# Patient Record
Sex: FEMALE | Race: WHITE | Employment: UNEMPLOYED | URBAN - METROPOLITAN AREA
[De-identification: names, ages, dates, MRNs, and addresses within clinical notes are randomized per-mention and may not be internally consistent; named-entity substitution may affect disease eponyms.]

---

## 2017-10-31 ENCOUNTER — OFFICE VISIT (OUTPATIENT)
Dept: OBGYN CLINIC | Age: 47
End: 2017-10-31
Payer: COMMERCIAL

## 2017-10-31 ENCOUNTER — HOSPITAL ENCOUNTER (OUTPATIENT)
Age: 47
Setting detail: SPECIMEN
Discharge: HOME | End: 2017-10-31
Payer: COMMERCIAL

## 2017-10-31 VITALS
DIASTOLIC BLOOD PRESSURE: 77 MMHG | HEART RATE: 89 BPM | OXYGEN SATURATION: 100 % | SYSTOLIC BLOOD PRESSURE: 132 MMHG | WEIGHT: 122 LBS | HEIGHT: 60 IN | RESPIRATION RATE: 16 BRPM | BODY MASS INDEX: 23.95 KG/M2

## 2017-10-31 DIAGNOSIS — Z01.419 ENCOUNTER FOR ROUTINE GYNECOLOGICAL EXAMINATION WITH PAPANICOLAOU SMEAR OF CERVIX: Primary | ICD-10-CM

## 2017-10-31 DIAGNOSIS — N93.9 ABNORMAL UTERINE BLEEDING (AUB): ICD-10-CM

## 2017-10-31 PROCEDURE — 99396 PREV VISIT EST AGE 40-64: CPT | Performed by: OBSTETRICS & GYNECOLOGY

## 2017-11-06 ENCOUNTER — HOSPITAL ENCOUNTER (OUTPATIENT)
Dept: ULTRASOUND IMAGING | Age: 47
Discharge: HOME | End: 2017-11-06
Payer: COMMERCIAL

## 2017-11-06 DIAGNOSIS — N93.9 ABNORMAL UTERINE BLEEDING (AUB): ICD-10-CM

## 2017-11-06 PROCEDURE — 76830 TRANSVAGINAL US NON-OB: CPT

## 2017-11-06 PROCEDURE — 76856 US EXAM PELVIC COMPLETE: CPT

## 2017-11-06 NOTE — PROGRESS NOTES
Robyn Paniagua  10/31/2017              48 y.o. Chief Complaint   Patient presents with    Gynecologic Exam     NM LYMPHOSCINTIGRAM 2017 SURGERY REQUESTED last pap was 16 wnl              No referring provider defined for this encounter. The patient was seen and examined. She has no chief complaint today and is here for her annual exam.  Her bowels are regular. There are no voiding complaints. She denies any bloating. She denies vaginal discharge.     HPI : 44yo  for annual exam. Having heavy painful periods that are worsening, treated for right breast cancer this year, not a CHT candidate.  ________________________________________________________________________    Past Medical History:   Diagnosis Date    Cancer ([facility] [address]     right breast, skin cancer    History of palpitations     borderline MVP    Menorrhagia     Migraine     Thyroid disease     Hypothyroidism                                                                   Past Surgical History:   Procedure Laterality Date    BREAST BIOPSY      ,     BREAST ENHANCEMENT SURGERY Bilateral     BREAST IMPLANT REMOVAL Bilateral     BREAST LUMPECTOMY Right     MASTECTOMY, BILATERAL  01/10/2017    sparing-left prophylactic with right sentinel node biopsies (8am) with right axillary lymph node dissection breast tissue expander insertion-alloderm    SKIN BIOPSY      skin cancer removed from back    SKIN BIOPSY Right     roght side of clavical     Family History   Problem Relation Age of Onset    Diabetes Father     Hypertension Father     Prostate Cancer Father     Breast Cancer Mother      Stage 1 (RAD/LUMP)    Cancer Maternal Grandmother      Pancreatic     History   Smoking Status    Never Smoker   Smokeless Tobacco    Never Used     History   Alcohol Use    Yes     Comment: occasionally       MEDICATIONS:  Current Outpatient Prescriptions   Medication Sig Dispense Refill    ibuprofen (ADVIL;MOTRIN) 800 MG ROS: No SOB, Pneumoniae,Cough, or Pulmonary Embolism History  Cardiovascular ROS: No Chest Pain with Exertion, Palpitations, Syncope, Edema, Arrhythmia  Gastrointestinal ROS: No Indigestion, Heartburn, Nausea, vomiting, Diahrea, Constipation,or Bowel Changes; No Bloody Stools or melena  Genito-Urinary ROS: No Dysuria, Hematuria or Nocturia. No Urinary Incontinence or Vaginal Discharge  Musculoskeletal ROS: No Arthralgia, Arthritis,Gout,Osteoporosis or Rheumatism  Neurological ROS: No CVA, Migraines, Epilepsy, Seizure Hx, or Limb Weakness  Dermatological ROS: No Rash, Itching, Hives, Mole Changes or Cancer                                                                                                                                                                                                                                PHYSICAL Exam:     Constitutional:  Blood pressure 132/77, pulse 89, resp. rate 16, height 5' (1.524 m), weight 122 lb (55.3 kg), last menstrual period 10/08/2017, SpO2 100 %, not currently breastfeeding. General Appearance: This  is a well Developed, well Nourished, well groomed female. Her BMI was reviewed. Nutritional decision making was discussed. Skin:  There was a Normal Inspection of the skin without rashes or lesions. There were no rashes. (Papular, Maculopapular, Hives, Pustular, Macular)     There were no lesions (Ulcers, Erythema, Abn. Appearing Nevi)        Lymphatic:  No Lymph Nodes were Palpable in the neck , axilla or groin. Inguinal lymph nodes wnl     Neck and EENT:  The neck was supple. There were no masses   The thyroid was not enlarged and had no masses. Respiratory: The lungs were auscultated and found to be clear. There were no rales, rhonchi or wheezes. There was a good respiratory effort. Cardiovascular: The heart was in a regular rate and rhythm. . No S3 or S4. There was no murmur appreciated. Location, grade, and radiation are not applicable. Extremities: The patients extremities were without calf tenderness, edema, or varicosities. There was full range of motion in all four extremities. Abdomen: The abdomen was soft and non-tender. There were good bowel sounds in all quadrants and there was no guarding, rebound or rigidity. On evaluation there was no evidence of hepatosplenomegaly and there was no costal vertebral anastacia tenderness bilaterally. No hernias were appreciated. Abdominal Scars: none    Psych: The patient had a normal Orientation to: Time, Place, Person, and Situation  There is no Mood / Affect changes    Breast:  (Chest)  Implants noted, healed incisions, and nipple reconstruction healing well without s/s infection  Self breast exams were reviewed in detail. Literature was given. Pelvic Exam:  Vulva and vagina appear normal. Bimanual exam reveals normal uterus and adnexa. Rectal Exam:  def      Musculosk:  Normal Gait and station was noted. Digits were evaluated without abnormal findings. Range of motion, stability and strength were evaluated and found to be appropriate for the patients age. ASSESSMENT:      48 y.o. Annual  1. Encounter for routine gynecological examination with Papanicolaou smear of cervix    2. Abnormal uterine bleeding (AUB)         Chief Complaint   Patient presents with    Gynecologic Exam     NM LYMPHOSCINTIGRAM 1/2017 SURGERY REQUESTED last pap was 9/26/16 wnl          Past Medical History:   Diagnosis Date    Cancer [facility])     right breast, skin cancer    History of palpitations     borderline MVP    Menorrhagia     Migraine     Thyroid disease     Hypothyroidism         Patient Active Problem List   Diagnosis    Carcinoma in situ of right breast          Hereditary Breast, Ovarian, Colon and Uterine Cancer screening Done. Tobacco & Secondary smoke risks reviewed; instructed on cessation and avoidance      Counseling Completed:   Annual  Breast cancer  AUB-obtain USN

## 2017-11-07 ENCOUNTER — OFFICE VISIT (OUTPATIENT)
Dept: OBGYN CLINIC | Age: 47
End: 2017-11-07
Payer: COMMERCIAL

## 2017-11-07 VITALS
HEIGHT: 60 IN | DIASTOLIC BLOOD PRESSURE: 62 MMHG | BODY MASS INDEX: 23.95 KG/M2 | SYSTOLIC BLOOD PRESSURE: 108 MMHG | WEIGHT: 122 LBS | OXYGEN SATURATION: 100 % | HEART RATE: 78 BPM | RESPIRATION RATE: 16 BRPM

## 2017-11-07 DIAGNOSIS — N93.9 ABNORMAL UTERINE BLEEDING (AUB): Primary | ICD-10-CM

## 2017-11-07 PROCEDURE — 99212 OFFICE O/P EST SF 10 MIN: CPT | Performed by: OBSTETRICS & GYNECOLOGY

## 2017-11-08 ENCOUNTER — PATIENT MESSAGE (OUTPATIENT)
Dept: OBGYN CLINIC | Age: 47
End: 2017-11-08

## 2017-11-08 LAB — CYTOLOGY REPORT: NORMAL

## 2017-11-08 NOTE — TELEPHONE ENCOUNTER
From: Mazin Perry  To: Bobby Leigh MD  Sent: 11/8/2017 11:57 AM EST  Subject: Visit Follow-Up Question    Thank you for taking your time with me yesterday to explain options, and how an ablation works. I do want to proceed with getting it done. I do have a question about timing of the procedure. After I got home and looked at my calendar, I realized that I have a colonoscopy scheduled dec 13. Is there any reason to be concerned about having these two procedures under anesthesia so close together?   Thanks, Dariusz

## 2017-11-09 NOTE — PROGRESS NOTES
The patient was seen today. She is here regarding USN results . Her bowels are regular and she is voiding without difficulty. HPI: 44yo  who has  with vasectomy, having breast cancer diagnosis this year, had previously used OCP to control heavy painful periods and is now not a candidate. Using double protection to avoid social embarressment. Past Medical History:   Diagnosis Date    Cancer [facility])     right breast, skin cancer    History of palpitations     borderline MVP    Menorrhagia     Migraine     Thyroid disease     Hypothyroidism     Past Surgical History:   Procedure Laterality Date    BREAST BIOPSY      ,     BREAST ENHANCEMENT SURGERY Bilateral     BREAST IMPLANT REMOVAL Bilateral     BREAST LUMPECTOMY Right     MASTECTOMY, BILATERAL  01/10/2017    sparing-left prophylactic with right sentinel node biopsies (8am) with right axillary lymph node dissection breast tissue expander insertion-alloderm    SKIN BIOPSY      skin cancer removed from back    SKIN BIOPSY Right 2017    roght side of clavical     REVIEW OF SYSTEMS:        A minimum of an eleven point review of systems was completed and found to be negative except for the pertinent positives found below. Constitutional: No fever, chills or malaise; No weight change or fatigue  Head and Eyes: No vision, Headache, Dizziness or trauma in last 12 months  ENT ROS: No hearing, Tinnitis, sinus or taste problems  Hematological and Lymphatic ROS:No Lymphoma, Von Willebrand's, Hemophillia or Bleeding History  Psych ROS: No Depression, Homicidal thoughts,suicidal thoughts, or anxiety  Breast ROS: No prior breast abnormalities or lumps  Respiratory ROS: No SOB, Pneumoniae,Cough, or Pulmonary Embolism History  Cardiovascular ROS: No Chest Pain with Exertion, Palpitations, Syncope, Edema, Arrhythmia  Gastrointestinal ROS: No Indigestion, Heartburn, Nausea, vomiting, Diahrea, Constipation,or Bowel Changes;  No Bloody Stools or melena  Genito-Urinary ROS: No Dysuria, Hematuria or Nocturia. No Urinary Incontinence or Vaginal Discharge  Musculoskeletal ROS: No Arthralgia, Arthritis,Gout,Osteoporosis or Rheumatism  Neurological ROS: No CVA, Migraines, Epilepsy, Seizure Hx, or Limb Weakness  Dermatological ROS: No Rash, Itching, Hives, Mole Changes or Cancer                                            Blood pressure 108/62, pulse 78, resp. rate 16, height 5' (1.524 m), weight 122 lb (55.3 kg), last menstrual period 10/08/2017, SpO2 100 %, not currently breastfeeding. Pelvic: Exam deferred. Assessment:   AUB-discussed ultrasound and option of endometrial ablation    PLAN:  Will proceed with Hysteroscopy Teodora PANDA  Return to the office in postop weeks. Counseled on preventative health maintenance follow-up. Patient was seen with total face to face time of 10 minutes. More than 50% of this visit was counseling and education regarding There were no encounter diagnoses. and Results   as well as  counseling on preventative health maintenance follow-up.

## 2017-11-30 ENCOUNTER — ANESTHESIA EVENT (OUTPATIENT)
Dept: OPERATING ROOM | Age: 47
End: 2017-11-30
Payer: COMMERCIAL

## 2017-11-30 RX ORDER — IBUPROFEN 200 MG
200 TABLET ORAL EVERY 6 HOURS PRN
Status: ON HOLD | COMMUNITY
End: 2017-12-01 | Stop reason: HOSPADM

## 2017-11-30 RX ORDER — INDAPAMIDE 1.25 MG
1 TABLET ORAL DAILY
COMMUNITY
Start: 2017-10-17

## 2017-11-30 NOTE — H&P
Breast biopsy (Bilateral); Breast surgery (Bilateral, 2017); Breast enhancement surgery (Bilateral, 2000); skin biopsy (2010); and skin biopsy (Right, 2017). ALLERGIES:  Allergies as of 11/09/2017    (No Known Allergies)       MEDICATIONS:  No current facility-administered medications for this encounter. FAMILY HISTORY:  family history includes Breast Cancer in her mother; Cancer in her maternal grandmother; Diabetes in her father; High Blood Pressure in her father; Hypertension in her father; Prostate Cancer in her father. SOCIAL HISTORY:   reports that she has never smoked. She has never used smokeless tobacco. She reports that she drinks about 0.6 oz of alcohol per week . She reports that she does not use drugs. VITALS:  Vitals:    11/30/17 1330 12/01/17 1002   BP:  114/65   Pulse:  85   Resp:  16   Temp:  98.4 °F (36.9 °C)   TempSrc:  Temporal   SpO2:  100%   Weight: 122 lb (55.3 kg)    Height: 5' (1.524 m)                                                                                                                              PHYSICAL EXAM:     General: AAO x 3, NAD  Heart: RRR, no murmur  Lungs: CTA b/l, no wheezing/rhonci   Abdomen: soft, non tender, BS x 4, no guarding or rigidity   Extremities: non tender, non edematous     LAB RESULTS:  No visits with results within 4 Week(s) from this visit. Latest known visit with results is:   Hospital Outpatient Visit on 10/31/2017   Component Date Value Ref Range Status    Cytology Report 11/08/2017    Final                    Value:(NOTE)  [id number]  [email or web address]  ANATOMIC PATHOLOGY  [address] [place] [address].   [place], [address]  [phone]  Fax: [phone]  GYNECOLOGIC CYTOLOGY REPORT    Patient Name: Shamar Beltran  MR#: [id number]  Specimen #[id number]  Source:  1: Cervical material, (ThinPrep vial, Imaging-assisted review)    Clinical History  No LMP date given  Z01.419 Routine gyn exam without abnormal findings  High Risk HPV DNA testing is requested if the diagnosis is ASC-US    INTERPRETATION    Cervical material, (ThinPrep vial, Imaging-assisted review):  Specimen Adequacy:      Satisfactory for evaluation.      - Endocervical/transformation zone component present. Descriptive Diagnosis:      Negative for intraepithelial lesion or malignancy. Cytotechnologist:   KATHY Cruz(ASCP)  **Electronically Signed Out**  mk/11/8/2017         DIAGNOSTICS:  Us Non Ob Transvaginal    Result Date: 11/7/2017  This is a copy and paste from another accession per [address] request EXAMINATION: PELVIC ULTRASOUND 11/6/2017 TECHNIQUE: Transabdominal and transvaginal pelvic ultrasound was performed. COMPARISON: None HISTORY: ORDERING SYSTEM PROVIDED HISTORY: Abnormal uterine bleeding (AUB) TECHNOLOGIST PROVIDED HISTORY: Reason for exam:-&gt;aub Ordering Physician Provided Reason for Exam: heavy cycles Abnormal uterine bleeding, heavy cycles for long time FINDINGS: Measurements: Uterus: 10.5 x 4.4 x 6 cm. Endometrial stripe: 12 mm Right Ovary: 2.7 x 1.2 x 1.8 cm Left Ovary: 1.7 x 1.6 x 2 cm Ultrasound Findings: Uterus: Uterus has a somewhat heterogeneous myometrial echotexture with small uterine fibroids, the largest in the anterior right uterine body measuring 2.5 x 2.8 x 2.2 cm. Endometrial stripe: Endometrial stripe is within normal limits. Right Ovary: Right ovary is within normal limits. Small follicles are present. Left Ovary: Left ovary is within normal limits. Free Fluid: No evidence of free fluid. Small uterine fibroids, the largest measuring up to 2.8 cm. Us Pelvis Complete    Result Date: 11/6/2017  EXAMINATION: PELVIC ULTRASOUND 11/6/2017 TECHNIQUE: Transabdominal and transvaginal pelvic ultrasound was performed.  COMPARISON: None HISTORY: ORDERING SYSTEM PROVIDED HISTORY: Abnormal uterine bleeding (AUB) TECHNOLOGIST PROVIDED HISTORY: Reason for exam:->aub Ordering Physician Provided Reason for Exam: heavy cycles Abnormal uterine bleeding, heavy cycles for long time FINDINGS: Measurements: Uterus:  10.5 x 4.4 x 6 cm. Endometrial stripe:  12 mm Right Ovary:  2.7 x 1.2 x 1.8 cm Left Ovary:  1.7 x 1.6 x 2 cm Ultrasound Findings: Uterus: Uterus has a somewhat heterogeneous myometrial echotexture with small uterine fibroids, the largest in the anterior right uterine body measuring 2.5 x 2.8 x 2.2 cm. Endometrial stripe: Endometrial stripe is within normal limits. Right Ovary: Right ovary is within normal limits. Small follicles are present. Left Ovary:  Left ovary is within normal limits. Free Fluid: No evidence of free fluid. Small uterine fibroids, the largest measuring up to 2.8 cm. DIAGNOSIS & PLAN:  1. Abnormal Uterine Bleeding              - Proceed with planned procedure: Hysteroscopy, D&C with Novasure              - Consent signed, on chart. - The patient is ready for transport to the operative suite.       Rehana Elkins DO  Ob/Gyn Resident  Pager: [phone] [facility] [facility]  12/1/2017, 10:07 AM

## 2017-12-01 ENCOUNTER — ANESTHESIA (OUTPATIENT)
Dept: OPERATING ROOM | Age: 47
End: 2017-12-01
Payer: COMMERCIAL

## 2017-12-01 ENCOUNTER — HOSPITAL ENCOUNTER (OUTPATIENT)
Age: 47
Setting detail: OUTPATIENT SURGERY
Discharge: HOME | End: 2017-12-01
Attending: OBSTETRICS & GYNECOLOGY | Admitting: OBSTETRICS & GYNECOLOGY
Payer: COMMERCIAL

## 2017-12-01 VITALS
HEART RATE: 73 BPM | RESPIRATION RATE: 20 BRPM | HEIGHT: 60 IN | SYSTOLIC BLOOD PRESSURE: 111 MMHG | WEIGHT: 122 LBS | DIASTOLIC BLOOD PRESSURE: 70 MMHG | TEMPERATURE: 96.8 F | OXYGEN SATURATION: 96 % | BODY MASS INDEX: 23.95 KG/M2

## 2017-12-01 VITALS — DIASTOLIC BLOOD PRESSURE: 46 MMHG | OXYGEN SATURATION: 100 % | TEMPERATURE: 96 F | SYSTOLIC BLOOD PRESSURE: 95 MMHG

## 2017-12-01 LAB — HCG, PREGNANCY URINE (POC): NEGATIVE

## 2017-12-01 PROCEDURE — 2580000003 HC RX 258: Performed by: ANESTHESIOLOGY

## 2017-12-01 PROCEDURE — 88305 TISSUE EXAM BY PATHOLOGIST: CPT

## 2017-12-01 PROCEDURE — 84703 CHORIONIC GONADOTROPIN ASSAY: CPT

## 2017-12-01 PROCEDURE — 6370000000 HC RX 637 (ALT 250 FOR IP): Performed by: ANESTHESIOLOGY

## 2017-12-01 PROCEDURE — 6360000002 HC RX W HCPCS: Performed by: ANESTHESIOLOGY

## 2017-12-01 PROCEDURE — 7100000000 HC PACU RECOVERY - FIRST 15 MIN: Performed by: OBSTETRICS & GYNECOLOGY

## 2017-12-01 PROCEDURE — 3600000004 HC SURGERY LEVEL 4 BASE: Performed by: OBSTETRICS & GYNECOLOGY

## 2017-12-01 PROCEDURE — 3600000014 HC SURGERY LEVEL 4 ADDTL 15MIN: Performed by: OBSTETRICS & GYNECOLOGY

## 2017-12-01 PROCEDURE — 3700000001 HC ADD 15 MINUTES (ANESTHESIA): Performed by: OBSTETRICS & GYNECOLOGY

## 2017-12-01 PROCEDURE — 7100000001 HC PACU RECOVERY - ADDTL 15 MIN: Performed by: OBSTETRICS & GYNECOLOGY

## 2017-12-01 PROCEDURE — 6360000002 HC RX W HCPCS: Performed by: NURSE ANESTHETIST, CERTIFIED REGISTERED

## 2017-12-01 PROCEDURE — 7100000011 HC PHASE II RECOVERY - ADDTL 15 MIN: Performed by: OBSTETRICS & GYNECOLOGY

## 2017-12-01 PROCEDURE — 7100000010 HC PHASE II RECOVERY - FIRST 15 MIN: Performed by: OBSTETRICS & GYNECOLOGY

## 2017-12-01 PROCEDURE — 6370000000 HC RX 637 (ALT 250 FOR IP): Performed by: NURSE ANESTHETIST, CERTIFIED REGISTERED

## 2017-12-01 PROCEDURE — 2580000003 HC RX 258: Performed by: OBSTETRICS & GYNECOLOGY

## 2017-12-01 PROCEDURE — 58563 HYSTEROSCOPY ABLATION: CPT | Performed by: OBSTETRICS & GYNECOLOGY

## 2017-12-01 PROCEDURE — 2500000003 HC RX 250 WO HCPCS: Performed by: NURSE ANESTHETIST, CERTIFIED REGISTERED

## 2017-12-01 PROCEDURE — 3700000000 HC ANESTHESIA ATTENDED CARE: Performed by: OBSTETRICS & GYNECOLOGY

## 2017-12-01 RX ORDER — OXYCODONE HYDROCHLORIDE AND ACETAMINOPHEN 5; 325 MG/1; MG/1
2 TABLET ORAL PRN
Status: COMPLETED | OUTPATIENT
Start: 2017-12-01 | End: 2017-12-01

## 2017-12-01 RX ORDER — ONDANSETRON 2 MG/ML
4 INJECTION INTRAMUSCULAR; INTRAVENOUS
Status: CANCELLED | OUTPATIENT
Start: 2017-12-01 | End: 2017-12-01

## 2017-12-01 RX ORDER — SODIUM CHLORIDE, SODIUM LACTATE, POTASSIUM CHLORIDE, CALCIUM CHLORIDE 600; 310; 30; 20 MG/100ML; MG/100ML; MG/100ML; MG/100ML
INJECTION, SOLUTION INTRAVENOUS CONTINUOUS
Status: DISCONTINUED | OUTPATIENT
Start: 2017-12-01 | End: 2017-12-01 | Stop reason: HOSPADM

## 2017-12-01 RX ORDER — HYDRALAZINE HYDROCHLORIDE 20 MG/ML
5 INJECTION INTRAMUSCULAR; INTRAVENOUS EVERY 10 MIN PRN
Status: DISCONTINUED | OUTPATIENT
Start: 2017-12-01 | End: 2017-12-01 | Stop reason: HOSPADM

## 2017-12-01 RX ORDER — MAGNESIUM HYDROXIDE 1200 MG/15ML
LIQUID ORAL CONTINUOUS PRN
Status: DISCONTINUED | OUTPATIENT
Start: 2017-12-01 | End: 2017-12-01 | Stop reason: HOSPADM

## 2017-12-01 RX ORDER — LABETALOL HYDROCHLORIDE 5 MG/ML
5 INJECTION, SOLUTION INTRAVENOUS EVERY 10 MIN PRN
Status: DISCONTINUED | OUTPATIENT
Start: 2017-12-01 | End: 2017-12-01 | Stop reason: HOSPADM

## 2017-12-01 RX ORDER — FENTANYL CITRATE 50 UG/ML
25 INJECTION, SOLUTION INTRAMUSCULAR; INTRAVENOUS EVERY 5 MIN PRN
Status: CANCELLED | OUTPATIENT
Start: 2017-12-01

## 2017-12-01 RX ORDER — OXYCODONE HYDROCHLORIDE AND ACETAMINOPHEN 5; 325 MG/1; MG/1
1 TABLET ORAL PRN
Status: COMPLETED | OUTPATIENT
Start: 2017-12-01 | End: 2017-12-01

## 2017-12-01 RX ORDER — DIPHENHYDRAMINE HYDROCHLORIDE 50 MG/ML
12.5 INJECTION INTRAMUSCULAR; INTRAVENOUS
Status: DISCONTINUED | OUTPATIENT
Start: 2017-12-01 | End: 2017-12-01 | Stop reason: HOSPADM

## 2017-12-01 RX ORDER — DEXAMETHASONE SODIUM PHOSPHATE 10 MG/ML
INJECTION INTRAMUSCULAR; INTRAVENOUS PRN
Status: DISCONTINUED | OUTPATIENT
Start: 2017-12-01 | End: 2017-12-01 | Stop reason: SDUPTHER

## 2017-12-01 RX ORDER — PROPOFOL 10 MG/ML
INJECTION, EMULSION INTRAVENOUS PRN
Status: DISCONTINUED | OUTPATIENT
Start: 2017-12-01 | End: 2017-12-01 | Stop reason: SDUPTHER

## 2017-12-01 RX ORDER — ONDANSETRON 2 MG/ML
INJECTION INTRAMUSCULAR; INTRAVENOUS PRN
Status: DISCONTINUED | OUTPATIENT
Start: 2017-12-01 | End: 2017-12-01 | Stop reason: SDUPTHER

## 2017-12-01 RX ORDER — MORPHINE SULFATE 2 MG/ML
2 INJECTION, SOLUTION INTRAMUSCULAR; INTRAVENOUS EVERY 5 MIN PRN
Status: DISCONTINUED | OUTPATIENT
Start: 2017-12-01 | End: 2017-12-01 | Stop reason: HOSPADM

## 2017-12-01 RX ORDER — SODIUM CHLORIDE, SODIUM LACTATE, POTASSIUM CHLORIDE, CALCIUM CHLORIDE 600; 310; 30; 20 MG/100ML; MG/100ML; MG/100ML; MG/100ML
INJECTION, SOLUTION INTRAVENOUS CONTINUOUS
Status: CANCELLED | OUTPATIENT
Start: 2017-12-01

## 2017-12-01 RX ORDER — FENTANYL CITRATE 50 UG/ML
50 INJECTION, SOLUTION INTRAMUSCULAR; INTRAVENOUS EVERY 5 MIN PRN
Status: DISCONTINUED | OUTPATIENT
Start: 2017-12-01 | End: 2017-12-01 | Stop reason: HOSPADM

## 2017-12-01 RX ORDER — LIDOCAINE HYDROCHLORIDE 10 MG/ML
INJECTION, SOLUTION INFILTRATION; PERINEURAL PRN
Status: DISCONTINUED | OUTPATIENT
Start: 2017-12-01 | End: 2017-12-01 | Stop reason: SDUPTHER

## 2017-12-01 RX ORDER — MEPERIDINE HYDROCHLORIDE 50 MG/ML
12.5 INJECTION INTRAMUSCULAR; INTRAVENOUS; SUBCUTANEOUS EVERY 5 MIN PRN
Status: DISCONTINUED | OUTPATIENT
Start: 2017-12-01 | End: 2017-12-01 | Stop reason: HOSPADM

## 2017-12-01 RX ORDER — ONDANSETRON 2 MG/ML
4 INJECTION INTRAMUSCULAR; INTRAVENOUS
Status: DISCONTINUED | OUTPATIENT
Start: 2017-12-01 | End: 2017-12-01 | Stop reason: HOSPADM

## 2017-12-01 RX ORDER — IBUPROFEN 600 MG/1
600 TABLET ORAL EVERY 6 HOURS PRN
Qty: 120 TABLET | Refills: 3 | Status: SHIPPED | OUTPATIENT
Start: 2017-12-01

## 2017-12-01 RX ORDER — HYDROCODONE BITARTRATE AND ACETAMINOPHEN 5; 325 MG/1; MG/1
1 TABLET ORAL EVERY 6 HOURS PRN
Qty: 5 TABLET | Refills: 0 | Status: SHIPPED | OUTPATIENT
Start: 2017-12-01 | End: 2017-12-02

## 2017-12-01 RX ORDER — FENTANYL CITRATE 50 UG/ML
25 INJECTION, SOLUTION INTRAMUSCULAR; INTRAVENOUS EVERY 5 MIN PRN
Status: DISCONTINUED | OUTPATIENT
Start: 2017-12-01 | End: 2017-12-01 | Stop reason: HOSPADM

## 2017-12-01 RX ORDER — LIDOCAINE HYDROCHLORIDE 10 MG/ML
1 INJECTION, SOLUTION EPIDURAL; INFILTRATION; INTRACAUDAL; PERINEURAL
Status: DISCONTINUED | OUTPATIENT
Start: 2017-12-01 | End: 2017-12-01 | Stop reason: HOSPADM

## 2017-12-01 RX ORDER — FENTANYL CITRATE 50 UG/ML
INJECTION, SOLUTION INTRAMUSCULAR; INTRAVENOUS PRN
Status: DISCONTINUED | OUTPATIENT
Start: 2017-12-01 | End: 2017-12-01 | Stop reason: SDUPTHER

## 2017-12-01 RX ORDER — LIDOCAINE HYDROCHLORIDE 10 MG/ML
1 INJECTION, SOLUTION EPIDURAL; INFILTRATION; INTRACAUDAL; PERINEURAL
Status: CANCELLED | OUTPATIENT
Start: 2017-12-01 | End: 2017-12-01

## 2017-12-01 RX ORDER — KETOROLAC TROMETHAMINE 30 MG/ML
INJECTION, SOLUTION INTRAMUSCULAR; INTRAVENOUS PRN
Status: DISCONTINUED | OUTPATIENT
Start: 2017-12-01 | End: 2017-12-01 | Stop reason: SDUPTHER

## 2017-12-01 RX ADMIN — OXYCODONE HYDROCHLORIDE AND ACETAMINOPHEN 1 TABLET: 5; 325 TABLET ORAL at 13:12

## 2017-12-01 RX ADMIN — PROPOFOL 180 MG: 10 INJECTION, EMULSION INTRAVENOUS at 10:51

## 2017-12-01 RX ADMIN — LIDOCAINE HYDROCHLORIDE 3 ML: 20 JELLY TOPICAL at 10:51

## 2017-12-01 RX ADMIN — FENTANYL CITRATE 25 MCG: 50 INJECTION INTRAMUSCULAR; INTRAVENOUS at 11:15

## 2017-12-01 RX ADMIN — ONDANSETRON 4 MG: 2 INJECTION, SOLUTION INTRAMUSCULAR; INTRAVENOUS at 11:04

## 2017-12-01 RX ADMIN — FENTANYL CITRATE 50 MCG: 50 INJECTION INTRAMUSCULAR; INTRAVENOUS at 12:55

## 2017-12-01 RX ADMIN — DEXAMETHASONE SODIUM PHOSPHATE 10 MG: 10 INJECTION INTRAMUSCULAR; INTRAVENOUS at 11:04

## 2017-12-01 RX ADMIN — MEPERIDINE HYDROCHLORIDE 12.5 MG: 50 INJECTION, SOLUTION INTRAMUSCULAR; INTRAVENOUS; SUBCUTANEOUS at 12:07

## 2017-12-01 RX ADMIN — SODIUM CHLORIDE, POTASSIUM CHLORIDE, SODIUM LACTATE AND CALCIUM CHLORIDE: 600; 310; 30; 20 INJECTION, SOLUTION INTRAVENOUS at 10:48

## 2017-12-01 RX ADMIN — KETOROLAC TROMETHAMINE 30 MG: 30 INJECTION, SOLUTION INTRAMUSCULAR; INTRAVENOUS at 11:14

## 2017-12-01 RX ADMIN — FENTANYL CITRATE 25 MCG: 50 INJECTION INTRAMUSCULAR; INTRAVENOUS at 11:05

## 2017-12-01 RX ADMIN — FENTANYL CITRATE 50 MCG: 50 INJECTION INTRAMUSCULAR; INTRAVENOUS at 12:50

## 2017-12-01 RX ADMIN — FENTANYL CITRATE 25 MCG: 50 INJECTION INTRAMUSCULAR; INTRAVENOUS at 10:51

## 2017-12-01 RX ADMIN — LIDOCAINE HYDROCHLORIDE 40 MG: 10 INJECTION, SOLUTION INFILTRATION; PERINEURAL at 10:51

## 2017-12-01 RX ADMIN — SODIUM CHLORIDE, POTASSIUM CHLORIDE, SODIUM LACTATE AND CALCIUM CHLORIDE: 600; 310; 30; 20 INJECTION, SOLUTION INTRAVENOUS at 10:15

## 2017-12-01 ASSESSMENT — PULMONARY FUNCTION TESTS
PIF_VALUE: 4
PIF_VALUE: 2
PIF_VALUE: 3
PIF_VALUE: 3
PIF_VALUE: 4
PIF_VALUE: 1
PIF_VALUE: 4
PIF_VALUE: 4
PIF_VALUE: 3
PIF_VALUE: 2
PIF_VALUE: 4
PIF_VALUE: 2
PIF_VALUE: 1
PIF_VALUE: 3
PIF_VALUE: 4
PIF_VALUE: 3
PIF_VALUE: 1
PIF_VALUE: 2
PIF_VALUE: 2
PIF_VALUE: 3
PIF_VALUE: 3
PIF_VALUE: 2
PIF_VALUE: 3
PIF_VALUE: 15
PIF_VALUE: 3
PIF_VALUE: 1
PIF_VALUE: 2
PIF_VALUE: 3
PIF_VALUE: 2
PIF_VALUE: 2
PIF_VALUE: 3
PIF_VALUE: 4
PIF_VALUE: 2
PIF_VALUE: 1
PIF_VALUE: 3
PIF_VALUE: 3
PIF_VALUE: 1
PIF_VALUE: 1
PIF_VALUE: 14

## 2017-12-01 ASSESSMENT — PAIN SCALES - GENERAL
PAINLEVEL_OUTOF10: 7
PAINLEVEL_OUTOF10: 0
PAINLEVEL_OUTOF10: 5
PAINLEVEL_OUTOF10: 4
PAINLEVEL_OUTOF10: 0
PAINLEVEL_OUTOF10: 7
PAINLEVEL_OUTOF10: 2
PAINLEVEL_OUTOF10: 0
PAINLEVEL_OUTOF10: 4

## 2017-12-01 ASSESSMENT — PAIN - FUNCTIONAL ASSESSMENT: PAIN_FUNCTIONAL_ASSESSMENT: 0-10

## 2017-12-01 ASSESSMENT — PAIN DESCRIPTION - DESCRIPTORS: DESCRIPTORS: CRAMPING

## 2017-12-01 ASSESSMENT — PAIN DESCRIPTION - PAIN TYPE: TYPE: SURGICAL PAIN

## 2017-12-01 NOTE — ANESTHESIA POSTPROCEDURE EVALUATION
Department of Anesthesiology  Postprocedure Note    Patient: Violet Wasserman  MRN: [id number]  YOB: 1970  Date of evaluation: 12/1/2017  Time:  1:15 PM     Procedure Summary     Date:  12/01/17 Room / Location:  [facility] OR  / [facility] OR    Anesthesia Start:  1048 Anesthesia Stop:  2829    Procedure:  DILATATION AND CURETTAGE HYSTEROSCOPY, NOVASURE (N/A Cervix) Diagnosis:  (ABNORMAL UTERINE BLEEDING)    Surgeon:  Shell Wu MD Responsible Provider:  Demetrius Olivares MD    Anesthesia Type:  general ASA Status:  1          Anesthesia Type: general    Kelsey Phase I: Kelsey Score: 9    Kelsey Phase II:      Last vitals: Reviewed and per EMR flowsheets.        Anesthesia Post Evaluation    Patient location during evaluation: PACU  Patient participation: complete - patient participated  Level of consciousness: awake and alert  Pain score: 2  Airway patency: patent  Nausea & Vomiting: no vomiting  Complications: no  Cardiovascular status: hemodynamically stable  Respiratory status: acceptable  Hydration status: stable

## 2017-12-01 NOTE — OP NOTE
Operative Note  Department of Obstetrics and Gynecology  [address] [facility]       Patient: Jesus Woodard   : 1970  MRN: [id number]       Acct: [de-identified]   PCP: Monica Browning MD  Date of Procedure: 17    Pre-operative Diagnosis: 50 y.o. female               Abnormal Uterine Bleeding               H/O Breast Cancer               H/O Bilateral Mastectomy       Post-operative Diagnosis: Same     Procedure: Hysteroscopy, Dilation and Curettage with Novasure     Surgeon: Dr. Jeffry Fernández MD      Assistant(s): Dr. Jeannine Sahni, PGY-3, Dr. Helyn Brunner, PGY-1     Anesthesia: General    Indications:   Patient reports abnormal uterine bleeding that has been interfering with her ADL. States she was previously using OCPs to control heavy painful periods, but was diagnosed with breast cancer this year and is no longer able to use OCPs. TVUS was performed, showing small fibroids within uterus (largest 2.5 2.8 x 2.2 cm in anterior right uterine body). Patient was counseled on medical vs surgical options including risks, benefits, and alternatives to each. She has elected to proceed with an endometrial ablation. Procedure Details: The patient was seen in the pre-op room. The risks, benefits, complications, treatment options, and expected outcomes were discussed with the patient. The patient concurred with the proposed plan, giving informed consent. The patient was taken to the Operating Room and identified as Jesus Woodard and the procedure was verified. A Time Out was held and the above information confirmed. After administration of adequate general  anesthesia. She was placed in the dorsolithotomy position with yellofin stirrups and prepped and draped in the usual sterile fashion. The bladder was not emptied secondary to patient having recently voided in pre-op. Examination under anesthesia revealed 10 week sized anteverted uterus.      A weighted speculum was inserted into the

## 2017-12-01 NOTE — BRIEF OP NOTE
Brief Operative Note  Department of Obstetrics and Gynecology  [address] [facility]     Patient: Jesus Woodard   : 1970  MRN: [id number]       Acct: [de-identified]   Date of Procedure: 17     Pre-operative Diagnosis: 50 y.o. female    Abnormal Uterine Bleeding    H/O Breast Cancer    H/O Bilateral Mastectomy      Post-operative Diagnosis: Same    Procedure: Hysteroscopy, Dilation and Curettage with Novasure    Surgeon: Dr. Jeffry Fernández MD      Assistant(s): Dr. Jeannine Sahni, PGY-3, Dr. Helyn Brunner, PGY-1    Anesthesia: general    Findings:  10 week sized anteverted uterus, bilateral tubal ostia visualized   Total IV fluids/Blood products:  400 ml crystalloid  Urine Output:  N/A    Estimated blood loss:  10 ml   Drains:  none  Specimens:  1. Endometrial sharp curettings   Instrument and Sponge Count: Correct  Complications:  none  Condition:  stable, transferred to post anesthesia recovery    See full operative report for further details.     Sanjay Calderon DO  Ob/Gyn Resident  Pager: [phone]  2017, 11:34 AM

## 2017-12-01 NOTE — ANESTHESIA PRE PROCEDURE
Department of Anesthesiology  Preprocedure Note       Name:  Agustín George   Age:  50 y.o.  :  1970                                          MRN:  [id number]         Date:  2017      Surgeon: Jeramy Qureshi):  Francisco Mena MD    Procedure: Procedure(s):  DILATATION AND CURETTAGE HYSTEROSCOPY, NOVASURE    Medications prior to admission:   Prior to Admission medications    Medication Sig Start Date End Date Taking? Authorizing Provider   ibuprofen (ADVIL;MOTRIN) 600 MG tablet Take 1 tablet by mouth every 6 hours as needed for Pain 17  Yes Dineen Mcardle, DO   HYDROcodone-acetaminophen (NORCO) 5-325 MG per tablet Take 1 tablet by mouth every 6 hours as needed for Pain . 17 Yes Dineen Mcardle, DO   FINACEA 15 % GEL Apply 1 Dose topically daily 10/17/17  Yes Historical Provider, MD   levothyroxine (SYNTHROID) 88 MCG tablet Take 88 mcg by mouth daily   Yes Historical Provider, MD   Multiple Vitamins-Minerals (THERAPEUTIC MULTIVITAMIN-MINERALS) tablet Take 1 tablet by mouth daily   Yes Historical Provider, MD   SUMAtriptan (IMITREX) 50 MG tablet Take 50 mg by mouth as needed for Migraine     Historical Provider, MD       Current medications:    Current Facility-Administered Medications   Medication Dose Route Frequency Provider Last Rate Last Dose    lactated ringers infusion   Intravenous Continuous John Randall MD        lidocaine PF 1 % injection 1 mL  1 mL Intradermal Once PRN John Randall MD           Allergies:     Allergies   Allergen Reactions    Seasonal Other (See Comments)     RUNNY NOSE, ITCHY WATERY EYES       Problem List:    Patient Active Problem List   Diagnosis Code    Carcinoma in situ of right breast D05.91       Past Medical History:        Diagnosis Date    Adult acne     CAD (coronary artery disease)     BORDERLINE MVP    Cancer ([facility] [address] ,,    right breast, skin cancer ON BACK, RIGHT COLLARBONE AREA  ABNORMAL CELLS    History of palpitations

## 2017-12-04 ENCOUNTER — TELEPHONE (OUTPATIENT)
Dept: OBGYN CLINIC | Age: 47
End: 2017-12-04

## 2017-12-04 LAB — SURGICAL PATHOLOGY REPORT: NORMAL

## 2017-12-14 ENCOUNTER — OFFICE VISIT (OUTPATIENT)
Dept: OBGYN CLINIC | Age: 47
End: 2017-12-14
Payer: COMMERCIAL

## 2017-12-14 VITALS
OXYGEN SATURATION: 100 % | HEIGHT: 60 IN | SYSTOLIC BLOOD PRESSURE: 134 MMHG | WEIGHT: 123.13 LBS | BODY MASS INDEX: 24.17 KG/M2 | DIASTOLIC BLOOD PRESSURE: 72 MMHG | HEART RATE: 68 BPM

## 2017-12-14 DIAGNOSIS — Z09 POSTOP CHECK: Primary | ICD-10-CM

## 2017-12-14 PROCEDURE — 99211 OFF/OP EST MAY X REQ PHY/QHP: CPT | Performed by: OBSTETRICS & GYNECOLOGY

## 2017-12-14 NOTE — PROGRESS NOTES
Postop Check  No complaints, having some scant pink discharge, without pain or odor. Reviewed pathology and benign. Will monitor periods for six months and let us know if not satisfied.

## 2021-04-01 ENCOUNTER — OFFICE VISIT (OUTPATIENT)
Dept: URBAN - METROPOLITAN AREA CLINIC 33 | Facility: CLINIC | Age: 51
End: 2021-04-01

## 2021-04-01 VITALS
WEIGHT: 122 LBS | SYSTOLIC BLOOD PRESSURE: 122 MMHG | HEIGHT: 60 IN | HEART RATE: 89 BPM | OXYGEN SATURATION: 98 % | BODY MASS INDEX: 23.95 KG/M2 | DIASTOLIC BLOOD PRESSURE: 80 MMHG

## 2021-04-01 RX ORDER — SODIUM, POTASSIUM,MAG SULFATES 17.5-3.13G
177 ML SOLUTION, RECONSTITUTED, ORAL ORAL 1
Qty: 1 KIT | Refills: 0 | OUTPATIENT
Start: 2021-04-01

## 2021-04-01 RX ORDER — SUMATRIPTAN SUCCINATE 50 MG/1
1 TABLET AT LEAST 2 HOURS BETWEEN DOSES AS NEEDED TABLET, FILM COATED ORAL TWICE A DAY
Status: ACTIVE | COMMUNITY

## 2021-04-01 RX ORDER — NIACINAMIDE 500 MG
AS DIRECTED TABLET ORAL
Status: ACTIVE | COMMUNITY

## 2021-04-01 RX ORDER — BALSALAZIDE DISODIUM 750 MG/1
2 CAPSULES CAPSULE ORAL TWICE A DAY
Qty: 120 | Refills: 1 | OUTPATIENT
Start: 2021-04-01

## 2021-04-01 RX ORDER — LEVOTHYROXINE SODIUM 88 UG/1
(PRIOR AUTH#:000006546837) CAPSULE ORAL
Qty: 90 CAP | Status: ACTIVE | COMMUNITY

## 2021-04-01 RX ORDER — PROPRANOLOL HYDROCHLORIDE 20 MG/1
1 TABLET TABLET ORAL TWICE A DAY
Status: ACTIVE | COMMUNITY

## 2021-04-01 NOTE — EXAM-MIGRATED EXAMINATIONS
GENERAL APPEARANCE: - alert, in no acute distress, well developed, well nourished;   HEAD: - normocephalic, atraumatic;   EYES: - sclera anicteric bilaterally;   ORAL CAVITY: - mucosa moist, MP 3;   THROAT: - clear;   NECK/THYROID: - neck supple, full range of motion, no cervical lymphadenopathy,   no thyromegaly, trachea midline;   SKIN: - warm and dry, no spider angiomata, palmar erythema or icterus;   HEART: - no murmurs, regular rate and rhythm, S1, S2 normal;   LUNGS: - clear to auscultation bilaterally, good air movement, no wheezes, rales, rhonchi;   ABDOMEN: - bowel sounds present, no masses palpable, no organomegaly , no rebound tenderness, soft, mild tenderness in LLQ, nondistended;   MUSCULOSKELETAL: - normal posture, normal gait and station, no decreased range of motion;   EXTREMITIES: - no clubbing, cyanosis, or edema;   PSYCH: - cooperative with exam, mood/affect full range;

## 2021-04-01 NOTE — HPI-MIGRATED HPI
;   ;     Proctitis : 50 year old female presents today to establish GI care. She relocated from [place] 1.5 years ago. She has a hx of proctitis DX in 2007. Symptoms included blood in stool, mucus, urgency and increased frequency. She was treated with Mesalamine with improvement. In 2015 patient switched to a gluten free diet that has helped.   Patient states that her most recent flare occurred in January when she was DX with COVID. She has been having urgency, and mucus on occasions. 1-4 BMs with soft stool. She is currently taking mesalamine 1000 suppositories prn. She would like to know if there are other options.   No abdominal pain, no nausea or emesis.   Last colonoscopy was 12/2017 while living in [place] and was normal. She was on no meds then.  Paternal grandmother DX with colon cancer in her late 70's early 80's    GI MD Note: occasional symptoms with mucus, blood and more frequent  BM's which he will treat with Canasa Suppositories. In January when she had COVID patient was placed on a Z pack and another couple of drugs. No crampy abdominal pain but has occasional bloating No weight loss. No diarrhea. No dysphagia. No heartburn or indigestion. She does have arthralgias but no swelling. No back pains. No skin lesions. No Jaundice. Does have dry eyes but no other ophthalmological issues.  ;   Surveillance Colonoscopy :  ;

## 2021-04-03 ENCOUNTER — LAB OUTSIDE AN ENCOUNTER (OUTPATIENT)
Dept: URBAN - METROPOLITAN AREA CLINIC 35 | Facility: CLINIC | Age: 51
End: 2021-04-03

## 2021-04-08 LAB
C. DIFFICILE TOXIN A/B, STOOL - QDX: (no result)
CALPROTECTIN, STOOL - QDX: (no result)
GASTROINTESTINAL PATHOGEN: (no result)
OVA AND PARASITE - QDX: (no result)

## 2021-04-26 ENCOUNTER — OFFICE VISIT (OUTPATIENT)
Dept: URBAN - METROPOLITAN AREA SURGERY CENTER 8 | Facility: SURGERY CENTER | Age: 51
End: 2021-04-26

## 2021-05-05 ENCOUNTER — LAB OUTSIDE AN ENCOUNTER (OUTPATIENT)
Dept: URBAN - METROPOLITAN AREA CLINIC 35 | Facility: CLINIC | Age: 51
End: 2021-05-05

## 2021-05-06 LAB
C-REACTIVE PROTEIN: 0.7
SED RATE BY MODIFIED: 9

## 2021-05-12 ENCOUNTER — OFFICE VISIT (OUTPATIENT)
Dept: URBAN - METROPOLITAN AREA CLINIC 35 | Facility: CLINIC | Age: 51
End: 2021-05-12

## 2021-05-12 VITALS
HEIGHT: 60 IN | OXYGEN SATURATION: 98 % | DIASTOLIC BLOOD PRESSURE: 70 MMHG | HEART RATE: 70 BPM | WEIGHT: 123 LBS | BODY MASS INDEX: 24.15 KG/M2 | SYSTOLIC BLOOD PRESSURE: 118 MMHG

## 2021-05-12 RX ORDER — SODIUM, POTASSIUM,MAG SULFATES 17.5-3.13G
177 ML SOLUTION, RECONSTITUTED, ORAL ORAL 1
Qty: 1 KIT | Refills: 0 | Status: ON HOLD | COMMUNITY
Start: 2021-04-01

## 2021-05-12 RX ORDER — MESALAMINE 1000 MG/1
1 SUPPOSITORY AT BEDTIME SUPPOSITORY RECTAL
Qty: 36 | Refills: 2 | OUTPATIENT
Start: 2021-05-12

## 2021-05-12 RX ORDER — BALSALAZIDE DISODIUM 750 MG/1
2 CAPSULES CAPSULE ORAL TWICE A DAY
Qty: 120 | Refills: 1 | Status: ACTIVE | COMMUNITY
Start: 2021-04-01

## 2021-05-12 RX ORDER — SUMATRIPTAN SUCCINATE 50 MG/1
1 TABLET AT LEAST 2 HOURS BETWEEN DOSES AS NEEDED TABLET, FILM COATED ORAL TWICE A DAY
Status: ACTIVE | COMMUNITY

## 2021-05-12 RX ORDER — NIACINAMIDE 500 MG
AS DIRECTED TABLET ORAL
Status: ACTIVE | COMMUNITY

## 2021-05-12 RX ORDER — LEVOTHYROXINE SODIUM 88 UG/1
(PRIOR AUTH#:000006546837) CAPSULE ORAL
Qty: 90 CAP | Status: ACTIVE | COMMUNITY

## 2021-05-12 RX ORDER — PROPRANOLOL HYDROCHLORIDE 20 MG/1
1 TABLET TABLET ORAL TWICE A DAY
Status: ACTIVE | COMMUNITY

## 2021-05-12 NOTE — HPI-MIGRATED HPI
;     Proctitis : Patient presents today to review labs completed on 5.5.2021, QDX completed on 4.3.2021 and colonoscopy completed on 4.26.2021. No complications post procedure. 1 BM per day, with soft stool. She admits mucus present in stool following the procedure. It has now subsided. No melena, or blood.  She is taking 2 capsules of Balsalazide 750 mg.   No abdominal pain, no nausea or emesis.   QDX 4.3.2021        Clostridium difficile: Not Detected        E Coli: Not Detected        Enterotoxigenic: Not Detected        Salmonella: Not Detected        Shigella: Not Detected        Shiga-like toxin: Not Detected        Vibrio cholerae: Not Detected        Norovirus: Not Detected        Rotavirus: Not Detected        Adenovirus: Not Detected        Cryptosporidium: Not Detected        Entamoeba histolytica: Not Detected        Giardia: Not Detected        Ova and Parasites: Not Detected        Calprotectin: Normal        Lactoferrin: Normal        C. difficile toxin A/B Negative  Labs: NL sed rate and CRP  Colonoscopy and path documented below. Endoscopically only mild erythema and granularity seen in distal rectum. All bx's though were NL with no active inflammation.  Patient has been on glutten free diet for 2 years because overall, makes her feel better. No known hx of celiac disease.    Last visit 4.1.2021 50 year old female presents today to establish GI care. She relocated from [place] 1.5 years ago. She has a hx of proctitis DX in 2007. Symptoms included blood in stool, mucus, urgency and increased frequency. She was treated with Mesalamine with improvement. In 2015 patient switched to a gluten free diet that has helped.   Patient states that her most recent flare occurred in January when she was DX with COVID. She has been having urgency, and mucus on occasions. 1-4 BMs with soft stool. She is currently taking mesalamine 1000 suppositories prn. She would like to know if there are other options.   No abdominal pain, no nausea or emesis.   Last colonoscopy was 12/2017 while living in [place] and was normal. She was on no meds then.  Paternal grandmother DX with colon cancer in her late 70's early 80's    GI MD Note: occasional symptoms with mucus, blood and more frequent  BM's which he will treat with Canasa Suppositories. In January when she had COVID patient was placed on a Z pack and another couple of drugs. No crampy abdominal pain but has occasional bloating No weight loss. No diarrhea. No dysphagia. No heartburn or indigestion. She does have arthralgias but no swelling. No back pains. No skin lesions. No Jaundice. Does have dry eyes but no other ophthalmological issues.;

## 2021-11-04 ENCOUNTER — LAB OUTSIDE AN ENCOUNTER (OUTPATIENT)
Dept: URBAN - METROPOLITAN AREA CLINIC 35 | Facility: CLINIC | Age: 51
End: 2021-11-04

## 2021-11-05 LAB
ABSOLUTE BASOPHILS: 50
ABSOLUTE EOSINOPHILS: 112
ABSOLUTE LYMPHOCYTES: 1837
ABSOLUTE MONOCYTES: 437
ABSOLUTE NEUTROPHILS: 3164
ALBUMIN/GLOBULIN RATIO: 1.8
ALBUMIN: 4.9
ALKALINE PHOSPHATASE: 85
ALT: 29
AST: 28
BASOPHILS: 0.9
BILIRUBIN, TOTAL: 0.4
BUN/CREATININE RATIO: (no result)
C-REACTIVE PROTEIN: 0.9
CALCIUM: 10
CARBON DIOXIDE: 29
CHLORIDE: 104
CREATININE: 0.81
EGFR AFRICAN AMERICAN: 97
EGFR NON-AFR. AMERICAN: 84
EOSINOPHILS: 2
GLOBULIN: 2.7
GLUCOSE: 87
HEMATOCRIT: 42.6
HEMOGLOBIN: 14.2
LYMPHOCYTES: 32.8
MCH: 31
MCHC: 33.3
MCV: 93
MONOCYTES: 7.8
MPV: 12.3
NEUTROPHILS: 56.5
PLATELET COUNT: 238
POTASSIUM: 4
PROTEIN, TOTAL: 7.6
RDW: 11.8
RED BLOOD CELL COUNT: 4.58
SED RATE BY MODIFIED: 6
SODIUM: 142
UREA NITROGEN (BUN): 15
WHITE BLOOD CELL COUNT: 5.6

## 2021-11-09 ENCOUNTER — OFFICE VISIT (OUTPATIENT)
Dept: URBAN - METROPOLITAN AREA CLINIC 35 | Facility: CLINIC | Age: 51
End: 2021-11-09

## 2021-11-09 VITALS
HEART RATE: 74 BPM | DIASTOLIC BLOOD PRESSURE: 76 MMHG | WEIGHT: 125 LBS | OXYGEN SATURATION: 98 % | HEIGHT: 60 IN | BODY MASS INDEX: 24.54 KG/M2 | SYSTOLIC BLOOD PRESSURE: 100 MMHG

## 2021-11-09 RX ORDER — MESALAMINE 1000 MG/1
1 SUPPOSITORY AT BEDTIME SUPPOSITORY RECTAL
Qty: 36 | Refills: 2 | Status: ACTIVE | COMMUNITY
Start: 2021-05-12

## 2021-11-09 RX ORDER — BALSALAZIDE DISODIUM 750 MG/1
2 CAPSULES CAPSULE ORAL TWICE A DAY
Qty: 120 | Refills: 1 | Status: DISCONTINUED | COMMUNITY
Start: 2021-04-01

## 2021-11-09 RX ORDER — PROPRANOLOL HYDROCHLORIDE 20 MG/1
1 TABLET TABLET ORAL TWICE A DAY
Status: ACTIVE | COMMUNITY

## 2021-11-09 RX ORDER — LEVOTHYROXINE SODIUM 88 UG/1
(PRIOR AUTH#:000006546837) CAPSULE ORAL
Qty: 90 CAP | Status: ACTIVE | COMMUNITY

## 2021-11-09 RX ORDER — NIACINAMIDE 500 MG
AS DIRECTED TABLET ORAL
Status: ACTIVE | COMMUNITY

## 2021-11-09 RX ORDER — SUMATRIPTAN SUCCINATE 50 MG/1
1 TABLET AT LEAST 2 HOURS BETWEEN DOSES AS NEEDED TABLET, FILM COATED ORAL TWICE A DAY
Status: ACTIVE | COMMUNITY

## 2021-11-09 RX ORDER — MESALAMINE 1000 MG/1
1 SUPPOSITORY AT BEDTIME SUPPOSITORY RECTAL
Qty: 36 | Refills: 2 | OUTPATIENT
Start: 2021-11-09

## 2021-11-09 RX ORDER — SODIUM, POTASSIUM,MAG SULFATES 17.5-3.13G
177 ML SOLUTION, RECONSTITUTED, ORAL ORAL 1
Qty: 1 KIT | Refills: 0 | Status: ON HOLD | COMMUNITY
Start: 2021-04-01

## 2021-11-09 NOTE — EXAM-MIGRATED EXAMINATIONS
GENERAL APPEARANCE: - alert, in no acute distress, well developed, well nourished;   HEAD: - normocephalic, atraumatic;   EYES: - sclera anicteric bilaterally;   SKIN: - warm and dry, no  icterus;   HEART: - no murmurs, regular rate and rhythm, S1, S2 normal;   LUNGS: - clear to auscultation bilaterally, good air movement, no wheezes, rales, rhonchi;   ABDOMEN: - bowel sounds present, no masses palpable, no organomegaly , no rebound tenderness, soft, nontender, nondistended;   EXTREMITIES: - no clubbing, cyanosis, or edema;   PSYCH: - cooperative with exam, mood/affect full range;

## 2021-11-09 NOTE — HPI-MIGRATED HPI
;     Proctitis : Patient presents today for follow up of Ulcerative Proctitis. She continues to use Canasa Suppositories twice a week and no other meds. She is having 1 BM per day, with soft stool. No melena, blood, or mucus.   Patient currently denies abdominal pain/cramps, nausea or emesis.   Last flare was in Sept after she went on vacation and forgot Canasa supp. Patient had mucus and a little blood with stools. Once she returned from vacation. patient increased Canasa to daily for 1 week then every other day for another 1 week and symptoms subsided.  Recent labs documented below. All labs are WNL.   Last visit: 5.12.21 Patient presents today to review labs completed on 5.5.2021, QDX completed on 4.3.2021 and colonoscopy completed on 4.26.2021. No complications post procedure. 1 BM per day, with soft stool. She admits mucus present in stool following the procedure. It has now subsided. No melena, or blood.  She is taking 2 capsules of Balsalazide 750 mg.   No abdominal pain, no nausea or emesis.   QDX 4.3.2021        Clostridium difficile: Not Detected        E Coli: Not Detected        Enterotoxigenic: Not Detected        Salmonella: Not Detected        Shigella: Not Detected        Shiga-like toxin: Not Detected        Vibrio cholerae: Not Detected        Norovirus: Not Detected        Rotavirus: Not Detected        Adenovirus: Not Detected        Cryptosporidium: Not Detected        Entamoeba histolytica: Not Detected        Giardia: Not Detected        Ova and Parasites: Not Detected        Calprotectin: Normal        Lactoferrin: Normal        C. difficile toxin A/B Negative  Labs: NL sed rate and CRP  Colonoscopy and path documented below. Endoscopically only mild erythema and granularity seen in distal rectum. All bx's though were NL with no active inflammation.  Patient has been on gluten free diet for 2 years because overall, makes her feel better. No known hx of celiac disease.    ;

## 2021-11-12 ENCOUNTER — LAB OUTSIDE AN ENCOUNTER (OUTPATIENT)
Dept: URBAN - METROPOLITAN AREA CLINIC 35 | Facility: CLINIC | Age: 51
End: 2021-11-12

## 2022-04-11 ENCOUNTER — LAB OUTSIDE AN ENCOUNTER (OUTPATIENT)
Dept: URBAN - METROPOLITAN AREA CLINIC 35 | Facility: CLINIC | Age: 52
End: 2022-04-11

## 2022-04-13 ENCOUNTER — TELEPHONE ENCOUNTER (OUTPATIENT)
Dept: URBAN - METROPOLITAN AREA CLINIC 35 | Facility: CLINIC | Age: 52
End: 2022-04-13

## 2022-05-09 ENCOUNTER — LAB OUTSIDE AN ENCOUNTER (OUTPATIENT)
Dept: URBAN - METROPOLITAN AREA CLINIC 35 | Facility: CLINIC | Age: 52
End: 2022-05-09

## 2022-05-10 ENCOUNTER — TELEPHONE ENCOUNTER (OUTPATIENT)
Dept: URBAN - METROPOLITAN AREA CLINIC 35 | Facility: CLINIC | Age: 52
End: 2022-05-10

## 2022-05-10 ENCOUNTER — OFFICE VISIT (OUTPATIENT)
Dept: URBAN - METROPOLITAN AREA CLINIC 35 | Facility: CLINIC | Age: 52
End: 2022-05-10

## 2022-05-10 LAB
A/G RATIO: 1.8
ABSOLUTE BASOPHILS: 22
ABSOLUTE EOSINOPHILS: 62
ABSOLUTE LYMPHOCYTES: 1669
ABSOLUTE MONOCYTES: 482
ABSOLUTE NEUTROPHILS: 3366
ALBUMIN: 4.4
ALKALINE PHOSPHATASE: 71
ALT (SGPT): 23
AST (SGOT): 23
BASOPHILS: 0.4
BILIRUBIN, TOTAL: 0.3
BUN/CREATININE RATIO: (no result)
BUN: 15
C-REACTIVE PROTEIN, QUANT: 1
CALCIUM: 9.7
CARBON DIOXIDE, TOTAL: 24
CHLORIDE: 106
CREATININE: 0.66
EGFR AFRICAN AMERICAN: 117
EGFR NON-AFR. AMERICAN: 101
EOSINOPHILS: 1.1
GLOBULIN, TOTAL: 2.4
GLUCOSE: 100
HEMATOCRIT: 38.5
HEMOGLOBIN: 13.1
LYMPHOCYTES: 29.8
MCH: 31.6
MCHC: 34
MCV: 92.8
MONOCYTES: 8.6
MPV: 11.9
NEUTROPHILS: 60.1
PLATELET COUNT: 219
POTASSIUM: 4
PROTEIN, TOTAL: 6.8
RDW: 11.8
RED BLOOD CELL COUNT: 4.15
SED RATE BY MODIFIED: 11
SODIUM: 143
WHITE BLOOD CELL COUNT: 5.6

## 2022-05-20 ENCOUNTER — WEB ENCOUNTER (OUTPATIENT)
Dept: URBAN - METROPOLITAN AREA CLINIC 35 | Facility: CLINIC | Age: 52
End: 2022-05-20

## 2022-05-25 ENCOUNTER — OFFICE VISIT (OUTPATIENT)
Dept: URBAN - METROPOLITAN AREA CLINIC 35 | Facility: CLINIC | Age: 52
End: 2022-05-25
Payer: COMMERCIAL

## 2022-05-25 VITALS
HEART RATE: 66 BPM | SYSTOLIC BLOOD PRESSURE: 105 MMHG | DIASTOLIC BLOOD PRESSURE: 66 MMHG | WEIGHT: 126 LBS | BODY MASS INDEX: 24.74 KG/M2 | OXYGEN SATURATION: 99 % | HEIGHT: 60 IN

## 2022-05-25 DIAGNOSIS — K51.20 ULCERATIVE PROCTITIS WITHOUT COMPLICATION: ICD-10-CM

## 2022-05-25 PROCEDURE — 99214 OFFICE O/P EST MOD 30 MIN: CPT | Performed by: INTERNAL MEDICINE

## 2022-05-25 RX ORDER — SODIUM, POTASSIUM,MAG SULFATES 17.5-3.13G
177 ML SOLUTION, RECONSTITUTED, ORAL ORAL 1
Qty: 1 KIT | Refills: 0 | Status: ON HOLD | COMMUNITY
Start: 2021-04-01

## 2022-05-25 RX ORDER — NIACINAMIDE 500 MG
AS DIRECTED TABLET ORAL
Status: ON HOLD | COMMUNITY

## 2022-05-25 RX ORDER — PROPRANOLOL HYDROCHLORIDE 20 MG/1
1 TABLET TABLET ORAL TWICE A DAY
Status: ACTIVE | COMMUNITY

## 2022-05-25 RX ORDER — L.ACID,FERM,PLA,RHA/B.BIF,LONG 126 MG
AS DIRECTED TABLET, DELAYED AND EXTENDED RELEASE ORAL
Status: ACTIVE | COMMUNITY

## 2022-05-25 RX ORDER — MESALAMINE 1000 MG/1
1 SUPPOSITORY AT BEDTIME SUPPOSITORY RECTAL
Qty: 36 | Refills: 2 | OUTPATIENT

## 2022-05-25 RX ORDER — SUMATRIPTAN SUCCINATE 50 MG/1
1 TABLET AT LEAST 2 HOURS BETWEEN DOSES AS NEEDED TABLET, FILM COATED ORAL TWICE A DAY
Status: ACTIVE | COMMUNITY

## 2022-05-25 RX ORDER — MESALAMINE 1000 MG/1
1 SUPPOSITORY AT BEDTIME SUPPOSITORY RECTAL
Qty: 36 | Refills: 2 | Status: ACTIVE | COMMUNITY
Start: 2021-11-09

## 2022-05-25 RX ORDER — LEVOTHYROXINE SODIUM 88 UG/1
(PRIOR AUTH#:000006546837) CAPSULE ORAL
Qty: 90 CAP | Status: ACTIVE | COMMUNITY

## 2022-05-25 NOTE — HPI-TODAY'S VISIT:
Patient present today for follow-up. She has Ulcerative Proctitis. Patient denies any recent flares. Patient continues to take Mesalamine 1000 mg suppository 2-3 times a week, with relief of symptoms. Currently, having 1 bowel movement a day. Stools are normal  without the presence of blood, mucus, and melena. Patient admits she saw some blood about 2 months ago, while in a camping trip.  Patient had labs completed on 05.10.2022 which were normal (CBC, CMP, Sed Rate, CRP) Stool for Lactoferrin and Calpro NL.  Last colonoscopy April 27th 2021 with mild inflammation in distal rectum but bxs were NL.

## 2022-11-08 ENCOUNTER — LAB OUTSIDE AN ENCOUNTER (OUTPATIENT)
Dept: URBAN - METROPOLITAN AREA CLINIC 35 | Facility: CLINIC | Age: 52
End: 2022-11-08

## 2022-11-11 LAB
C. DIFFICILE TOXIN A/B, STOOL - QDX: (no result)
CALPROTECTIN, STOOL - QDX: (no result)

## 2022-11-12 ENCOUNTER — TELEPHONE ENCOUNTER (OUTPATIENT)
Dept: URBAN - METROPOLITAN AREA CLINIC 35 | Facility: CLINIC | Age: 52
End: 2022-11-12

## 2022-11-22 LAB
C-REACTIVE PROTEIN, QUANT: <1
SEDIMENTATION RATE-WESTERGREN: 4

## 2022-11-25 ENCOUNTER — LAB OUTSIDE AN ENCOUNTER (OUTPATIENT)
Dept: URBAN - METROPOLITAN AREA CLINIC 35 | Facility: CLINIC | Age: 52
End: 2022-11-25

## 2022-11-29 ENCOUNTER — OFFICE VISIT (OUTPATIENT)
Dept: URBAN - METROPOLITAN AREA CLINIC 35 | Facility: CLINIC | Age: 52
End: 2022-11-29
Payer: COMMERCIAL

## 2022-11-29 VITALS
OXYGEN SATURATION: 98 % | BODY MASS INDEX: 24.35 KG/M2 | HEIGHT: 60 IN | DIASTOLIC BLOOD PRESSURE: 68 MMHG | WEIGHT: 124 LBS | SYSTOLIC BLOOD PRESSURE: 108 MMHG | HEART RATE: 73 BPM

## 2022-11-29 DIAGNOSIS — K51.20 ULCERATIVE PROCTITIS WITHOUT COMPLICATION: ICD-10-CM

## 2022-11-29 PROBLEM — 3951002: Status: ACTIVE | Noted: 2021-04-01

## 2022-11-29 PROCEDURE — 99213 OFFICE O/P EST LOW 20 MIN: CPT | Performed by: INTERNAL MEDICINE

## 2022-11-29 RX ORDER — NIACINAMIDE 500 MG
AS DIRECTED TABLET ORAL
Status: ON HOLD | COMMUNITY

## 2022-11-29 RX ORDER — MESALAMINE 1000 MG/1
1 SUPPOSITORY AT BEDTIME SUPPOSITORY RECTAL
Qty: 36 | Refills: 2 | Status: ACTIVE | COMMUNITY

## 2022-11-29 RX ORDER — LEVOTHYROXINE SODIUM 88 UG/1
(PRIOR AUTH#:000006546837) CAPSULE ORAL
Qty: 90 CAP | Status: ACTIVE | COMMUNITY

## 2022-11-29 RX ORDER — SODIUM, POTASSIUM,MAG SULFATES 17.5-3.13G
177 ML SOLUTION, RECONSTITUTED, ORAL ORAL 1
Qty: 1 KIT | Refills: 0 | Status: ON HOLD | COMMUNITY
Start: 2021-04-01

## 2022-11-29 RX ORDER — MESALAMINE 1000 MG/1
1 SUPPOSITORY AT BEDTIME SUPPOSITORY RECTAL
Qty: 45 | Refills: 3

## 2022-11-29 RX ORDER — SUMATRIPTAN SUCCINATE 50 MG/1
1 TABLET AT LEAST 2 HOURS BETWEEN DOSES AS NEEDED TABLET, FILM COATED ORAL TWICE A DAY
Status: ACTIVE | COMMUNITY

## 2022-11-29 RX ORDER — PROPRANOLOL HYDROCHLORIDE 20 MG/1
1 TABLET TABLET ORAL TWICE A DAY
Status: ACTIVE | COMMUNITY

## 2022-11-29 RX ORDER — L.ACID,FERM,PLA,RHA/B.BIF,LONG 126 MG
AS DIRECTED TABLET, DELAYED AND EXTENDED RELEASE ORAL
Status: ACTIVE | COMMUNITY

## 2022-11-29 NOTE — HPI-TODAY'S VISIT:
51 Year old female patient presents today for 6 months follow up. Patient has a hx of Ulcerative Proctitis. She denies recent flares. Patient admits completing labs as indicated. Patient admits doing well on Canasa 2-3 times a week. Currently, reports one  BM a day without strain. Stools are formed. Denies the presence of blood, mucus or melena in stools. No abdominal pain, no anorectal pain. No recent CP, SOB or fever. Of note patient had COVID in October; diagnosed 8th of October

## 2023-11-28 ENCOUNTER — OFFICE VISIT (OUTPATIENT)
Dept: URBAN - METROPOLITAN AREA CLINIC 35 | Facility: CLINIC | Age: 53
End: 2023-11-28

## 2023-12-07 ENCOUNTER — TELEPHONE ENCOUNTER (OUTPATIENT)
Dept: URBAN - METROPOLITAN AREA CLINIC 35 | Facility: CLINIC | Age: 53
End: 2023-12-07

## 2023-12-12 ENCOUNTER — OFFICE VISIT (OUTPATIENT)
Dept: URBAN - METROPOLITAN AREA CLINIC 35 | Facility: CLINIC | Age: 53
End: 2023-12-12

## 2023-12-12 NOTE — HPI-FOLLOW UP VISIT
Patient presents today for her yearly follow-up visit. Patient admits/denies any associated symptoms at this time.

## 2024-01-05 ENCOUNTER — LAB OUTSIDE AN ENCOUNTER (OUTPATIENT)
Dept: URBAN - METROPOLITAN AREA CLINIC 35 | Facility: CLINIC | Age: 54
End: 2024-01-05

## 2024-01-09 LAB — CALPROTECTIN, STOOL - QDX: (no result)

## 2024-01-15 LAB
ABSOLUTE BASOPHILS: 43
ABSOLUTE EOSINOPHILS: 49
ABSOLUTE LYMPHOCYTES: 1836
ABSOLUTE MONOCYTES: 494
ABSOLUTE NEUTROPHILS: 3678
ALBUMIN/GLOBULIN RATIO: 2.1
ALBUMIN: 4.2
ALKALINE PHOSPHATASE: 98
ALT (SGPT): 23
AST (SGOT): 31
BASOPHILS: 0.7
BILIRUBIN, DIRECT: 0.1
BILIRUBIN, INDIRECT: 0.2
BILIRUBIN, TOTAL: 0.3
BUN/CREATININE RATIO: (no result)
C-REACTIVE PROTEIN, QUANT: 0.7
CALCIUM: 9.4
CARBON DIOXIDE: 28
CHLORIDE: 106
CREATININE: 0.64
EGFR: 104
EOSINOPHILS: 0.8
GLOBULIN: 2
GLUCOSE: 101
HEMATOCRIT: 39
HEMOGLOBIN: 13.4
LYMPHOCYTES: 30.1
MCH: 31.6
MCHC: 34.4
MCV: 92
MONOCYTES: 8.1
MPV: 11.4
NEUTROPHILS: 60.3
PLATELET COUNT: 267
POTASSIUM: 4.1
PROTEIN, TOTAL: 6.2
RDW: 12.2
RED BLOOD CELL COUNT: 4.24
SODIUM: 143
UREA NITROGEN (BUN): 13
WHITE BLOOD CELL COUNT: 6.1

## 2024-01-18 ENCOUNTER — OFFICE VISIT (OUTPATIENT)
Dept: URBAN - METROPOLITAN AREA CLINIC 35 | Facility: CLINIC | Age: 54
End: 2024-01-18
Payer: COMMERCIAL

## 2024-01-18 VITALS
OXYGEN SATURATION: 99 % | SYSTOLIC BLOOD PRESSURE: 110 MMHG | WEIGHT: 125 LBS | DIASTOLIC BLOOD PRESSURE: 75 MMHG | HEIGHT: 60 IN | HEART RATE: 62 BPM | BODY MASS INDEX: 24.54 KG/M2

## 2024-01-18 DIAGNOSIS — K51.20 ACUTE ULCERATIVE PROCTITIS: ICD-10-CM

## 2024-01-18 PROCEDURE — 99213 OFFICE O/P EST LOW 20 MIN: CPT | Performed by: INTERNAL MEDICINE

## 2024-01-18 RX ORDER — L.ACID,FERM,PLA,RHA/B.BIF,LONG 126 MG
AS DIRECTED TABLET, DELAYED AND EXTENDED RELEASE ORAL
Status: ACTIVE | COMMUNITY

## 2024-01-18 RX ORDER — SODIUM, POTASSIUM,MAG SULFATES 17.5-3.13G
177 ML SOLUTION, RECONSTITUTED, ORAL ORAL 1
Qty: 1 KIT | Refills: 0 | Status: ON HOLD | COMMUNITY
Start: 2021-04-01

## 2024-01-18 RX ORDER — MESALAMINE 1000 MG/1
1 SUPPOSITORY AT BEDTIME SUPPOSITORY RECTAL
Qty: 45 | Refills: 3 | OUTPATIENT
Start: 2024-01-18 | End: 2025-01-31

## 2024-01-18 RX ORDER — PROPRANOLOL HYDROCHLORIDE 20 MG/1
1 TABLET TABLET ORAL TWICE A DAY
Status: ACTIVE | COMMUNITY

## 2024-01-18 RX ORDER — LEVOTHYROXINE SODIUM 88 UG/1
(PRIOR AUTH#:000006546837) CAPSULE ORAL
Qty: 90 CAP | Status: ACTIVE | COMMUNITY

## 2024-01-18 RX ORDER — NIACINAMIDE 500 MG
AS DIRECTED TABLET ORAL
Status: ON HOLD | COMMUNITY

## 2024-01-18 RX ORDER — MESALAMINE 1000 MG/1
1 SUPPOSITORY AT BEDTIME SUPPOSITORY RECTAL
Qty: 45 | Refills: 3 | Status: ACTIVE | COMMUNITY

## 2024-01-18 RX ORDER — SUMATRIPTAN SUCCINATE 50 MG/1
1 TABLET AT LEAST 2 HOURS BETWEEN DOSES AS NEEDED TABLET, FILM COATED ORAL TWICE A DAY
Status: ACTIVE | COMMUNITY

## 2024-01-18 NOTE — HPI-FOLLOW UP VISIT
53 year old female patient  with previous history of ulcerative proctitis without complication presents today for her 14-month follow up. Patient admits relief of symptoms with medication as prescribed.  Patient currently  admits 1 bowel movements per day with solid consistency without melena, blood, or mucus.  Patient uses her Canasa suppository twice a week, and does well. If she forgets using the suppository, she starts seeing mucus in stool.   Lab 01/05/2024  Calprotectin: 18.1      Lab 12/07/2023  BMP: Normal values except glucose - 101 (H) CRP: Normal values HPF:  Normal values CBC: Normal values  Last COL - 04/2021 Bowel prep was good Pathology - Negative for active colitis

## 2024-10-17 ENCOUNTER — TELEPHONE ENCOUNTER (OUTPATIENT)
Dept: URBAN - METROPOLITAN AREA CLINIC 35 | Facility: CLINIC | Age: 54
End: 2024-10-17

## 2024-10-26 ENCOUNTER — LAB OUTSIDE AN ENCOUNTER (OUTPATIENT)
Dept: URBAN - METROPOLITAN AREA CLINIC 35 | Facility: CLINIC | Age: 54
End: 2024-10-26

## 2024-12-18 ENCOUNTER — OFFICE VISIT (OUTPATIENT)
Dept: URBAN - METROPOLITAN AREA SURGERY CENTER 8 | Facility: SURGERY CENTER | Age: 54
End: 2024-12-18
Payer: COMMERCIAL

## 2024-12-18 ENCOUNTER — CLAIMS CREATED FROM THE CLAIM WINDOW (OUTPATIENT)
Dept: URBAN - METROPOLITAN AREA CLINIC 4 | Facility: CLINIC | Age: 54
End: 2024-12-18
Payer: COMMERCIAL

## 2024-12-18 DIAGNOSIS — D12.4 ADENOMA OF DESCENDING COLON: ICD-10-CM

## 2024-12-18 DIAGNOSIS — D12.4 BENIGN NEOPLASM OF DESCENDING COLON: ICD-10-CM

## 2024-12-18 DIAGNOSIS — K63.89 OTHER SPECIFIED DISEASES OF INTESTINE: ICD-10-CM

## 2024-12-18 DIAGNOSIS — D12.2 BENIGN NEOPLASM OF ASCENDING COLON: ICD-10-CM

## 2024-12-18 DIAGNOSIS — D12.2 ADENOMA OF ASCENDING COLON: ICD-10-CM

## 2024-12-18 DIAGNOSIS — K64.8 OTHER HEMORRHOIDS: ICD-10-CM

## 2024-12-18 PROCEDURE — 88305 TISSUE EXAM BY PATHOLOGIST: CPT | Performed by: PATHOLOGY

## 2024-12-18 PROCEDURE — 00811 ANES LWR INTST NDSC NOS: CPT | Performed by: NURSE ANESTHETIST, CERTIFIED REGISTERED

## 2024-12-18 PROCEDURE — 45380 COLONOSCOPY AND BIOPSY: CPT | Performed by: INTERNAL MEDICINE

## 2024-12-18 RX ORDER — MESALAMINE 1000 MG/1
1 SUPPOSITORY AT BEDTIME SUPPOSITORY RECTAL
Qty: 45 | Refills: 3 | Status: ACTIVE | COMMUNITY
Start: 2024-01-18 | End: 2025-01-31

## 2024-12-18 RX ORDER — SODIUM, POTASSIUM,MAG SULFATES 17.5-3.13G
177 ML SOLUTION, RECONSTITUTED, ORAL ORAL 1
Qty: 1 KIT | Refills: 0 | Status: ON HOLD | COMMUNITY
Start: 2021-04-01

## 2024-12-18 RX ORDER — PROPRANOLOL HYDROCHLORIDE 20 MG/1
1 TABLET TABLET ORAL TWICE A DAY
Status: ACTIVE | COMMUNITY

## 2024-12-18 RX ORDER — MESALAMINE 1000 MG/1
1 SUPPOSITORY AT BEDTIME SUPPOSITORY RECTAL
Qty: 45 | Refills: 3 | Status: ACTIVE | COMMUNITY

## 2024-12-18 RX ORDER — SUMATRIPTAN SUCCINATE 50 MG/1
1 TABLET AT LEAST 2 HOURS BETWEEN DOSES AS NEEDED TABLET, FILM COATED ORAL TWICE A DAY
Status: ACTIVE | COMMUNITY

## 2024-12-18 RX ORDER — L.ACID,FERM,PLA,RHA/B.BIF,LONG 126 MG
AS DIRECTED TABLET, DELAYED AND EXTENDED RELEASE ORAL
Status: ACTIVE | COMMUNITY

## 2024-12-18 RX ORDER — NIACINAMIDE 500 MG
AS DIRECTED TABLET ORAL
Status: ON HOLD | COMMUNITY

## 2024-12-18 RX ORDER — LEVOTHYROXINE SODIUM 88 UG/1
(PRIOR AUTH#:000006546837) CAPSULE ORAL
Qty: 90 CAP | Status: ACTIVE | COMMUNITY

## 2025-01-16 ENCOUNTER — OFFICE VISIT (OUTPATIENT)
Dept: URBAN - METROPOLITAN AREA SURGERY CENTER 8 | Facility: SURGERY CENTER | Age: 55
End: 2025-01-16

## 2025-01-28 ENCOUNTER — DASHBOARD ENCOUNTERS (OUTPATIENT)
Age: 55
End: 2025-01-28

## 2025-01-28 ENCOUNTER — OFFICE VISIT (OUTPATIENT)
Dept: URBAN - METROPOLITAN AREA CLINIC 35 | Facility: CLINIC | Age: 55
End: 2025-01-28
Payer: COMMERCIAL

## 2025-01-28 VITALS
DIASTOLIC BLOOD PRESSURE: 70 MMHG | WEIGHT: 128 LBS | HEART RATE: 73 BPM | BODY MASS INDEX: 25.13 KG/M2 | OXYGEN SATURATION: 99 % | HEIGHT: 60 IN | SYSTOLIC BLOOD PRESSURE: 110 MMHG

## 2025-01-28 DIAGNOSIS — D12.4 ADENOMATOUS POLYP OF DESCENDING COLON: ICD-10-CM

## 2025-01-28 DIAGNOSIS — K51.20 ULCERATIVE PROCTITIS WITHOUT COMPLICATION: ICD-10-CM

## 2025-01-28 DIAGNOSIS — D12.2 ADENOMATOUS POLYP OF ASCENDING COLON: ICD-10-CM

## 2025-01-28 PROCEDURE — 99214 OFFICE O/P EST MOD 30 MIN: CPT | Performed by: INTERNAL MEDICINE

## 2025-01-28 RX ORDER — MESALAMINE 1000 MG/1
1 SUPPOSITORY AT BEDTIME SUPPOSITORY RECTAL
Qty: 45 | Refills: 3
Start: 2024-01-18 | End: 2026-01-23

## 2025-01-28 RX ORDER — NIACINAMIDE 500 MG
AS DIRECTED TABLET ORAL
Status: ON HOLD | COMMUNITY

## 2025-01-28 RX ORDER — LEVOTHYROXINE SODIUM 88 UG/1
(PRIOR AUTH#:000006546837) CAPSULE ORAL
Qty: 90 CAP | Status: ACTIVE | COMMUNITY

## 2025-01-28 RX ORDER — MESALAMINE 1000 MG/1
1 SUPPOSITORY AT BEDTIME SUPPOSITORY RECTAL
Qty: 45 | Refills: 3 | Status: ACTIVE | COMMUNITY
Start: 2024-01-18 | End: 2025-01-31

## 2025-01-28 RX ORDER — L.ACID,FERM,PLA,RHA/B.BIF,LONG 126 MG
AS DIRECTED TABLET, DELAYED AND EXTENDED RELEASE ORAL
Status: ACTIVE | COMMUNITY

## 2025-01-28 RX ORDER — SUMATRIPTAN SUCCINATE 50 MG/1
1 TABLET AT LEAST 2 HOURS BETWEEN DOSES AS NEEDED TABLET, FILM COATED ORAL TWICE A DAY
Status: ACTIVE | COMMUNITY

## 2025-01-28 RX ORDER — MESALAMINE 1000 MG/1
1 SUPPOSITORY AT BEDTIME SUPPOSITORY RECTAL
Qty: 45 | Refills: 3 | Status: ON HOLD | COMMUNITY

## 2025-01-28 RX ORDER — SODIUM, POTASSIUM,MAG SULFATES 17.5-3.13G
177 ML SOLUTION, RECONSTITUTED, ORAL ORAL 1
Qty: 1 KIT | Refills: 0 | Status: ON HOLD | COMMUNITY
Start: 2021-04-01

## 2025-01-28 RX ORDER — PROPRANOLOL HYDROCHLORIDE 20 MG/1
1 TABLET TABLET ORAL TWICE A DAY
Status: ACTIVE | COMMUNITY

## 2025-01-28 NOTE — HPI-TODAY'S VISIT:
54-year-old female patient with previous history of Ulcerative proctitis presents today for her follow-up COL. Since the procedure patient mentions no complaints. Patient is using Mesalamine suppositories every other night, as prescribed with adequate management of symptoms. Patient currently mentions 1 bowel movement per day with solid consistency without blood melena or pain.    Last appointment  Mesalamine 1 GM Suppository every other night   COL - 12/18/2025 - Impression The quality of the bowel preparation was excellent.  The examined portion of the ileum was normal.  Normal mucosa in the cecum and in the ascending colon. Biopsied.  Normal mucosa in the transverse colon. Biopsied.  Normal mucosa in the descending colon and in the sigmoid colon. Biopsied.  Normal mucosa in the rectum. Biopsied.  Three 3 to 4 mm polyps in the distal ascending colon and in the descending colon, removed with a cold biopsy forceps. Resected and retrieved.  Internal hemorrhoids    Pathology (A) Colon, Ascending, Biopsy (Cold Forceps); Random:  NO SIGNIFICANT ABNORMALITY.  (B) Colon, Ascending, Distal, Polypectomy (Cold Forceps):  TUBULAR ADENOMA(S).  (C) Colon, Transverse, Biopsy (Cold Forceps); Random:  NO SIGNIFICANT ABNORMALITY.  (D) Colon, Descending, Polypectomy (Cold Forceps):  TUBULAR ADENOMA(S).  (E) Colon, Descending, sigmoid, Biopsy (Cold Forceps); Random:  NO SIGNIFICANT ABNORMALITY.  (F) Rectum, Biopsy (Cold Forceps); Random:  NO SIGNIFICANT ABNORMALITY.

## (undated) DEVICE — SHEET DRAPE FULL 70X100

## (undated) DEVICE — MEDI-VAC NON-CONDUCTIVE SUCTION TUBING 7MM X 6.1M (20 FT.) L: Brand: CARDINAL HEALTH

## (undated) DEVICE — SET ENDOSCP SEAL HYSTEROSCOPE RIG OUTFLO CHN DISP MYOSURE

## (undated) DEVICE — 1016 S-DRAPE IRRIG POUCH 10/BOX: Brand: STERI-DRAPE™

## (undated) DEVICE — COVER OR TBL W40XL90IN ABSRB STD AND GRIPPY BK SAHARA

## (undated) DEVICE — NEEDLE SPNL 22GA L3.5IN BLK HUB S STL REG WALL FIT STYL W/

## (undated) DEVICE — SET FLD MGMT CTRL SYS INFLO TB AQUILEX

## (undated) DEVICE — Z DISCONTINUED NO SUB IDED DEVICE ABLAT ENDOMET UTER SOUNDING ES SURESOUND NOVASURE

## (undated) DEVICE — SET FLD MGMT OUTFLO TB DISP FOR CTRL SYS AQUILEX

## (undated) DEVICE — SVMMC GYN MIN PK

## (undated) DEVICE — CYSTO/BLADDER IRRIGATION SET, REGULATING CLAMP